# Patient Record
Sex: FEMALE | Race: WHITE | NOT HISPANIC OR LATINO | URBAN - METROPOLITAN AREA
[De-identification: names, ages, dates, MRNs, and addresses within clinical notes are randomized per-mention and may not be internally consistent; named-entity substitution may affect disease eponyms.]

---

## 2024-09-17 ENCOUNTER — OFFICE VISIT (OUTPATIENT)
Dept: ORTHOPEDIC SURGERY | Facility: CLINIC | Age: 54
End: 2024-09-17
Payer: COMMERCIAL

## 2024-09-17 VITALS — HEIGHT: 69 IN | WEIGHT: 260 LBS | BODY MASS INDEX: 38.51 KG/M2

## 2024-09-17 DIAGNOSIS — M17.0 ARTHRITIS OF BOTH KNEES: Primary | ICD-10-CM

## 2024-09-17 DIAGNOSIS — G89.29 CHRONIC PAIN OF BOTH KNEES: ICD-10-CM

## 2024-09-17 DIAGNOSIS — M25.562 CHRONIC PAIN OF BOTH KNEES: ICD-10-CM

## 2024-09-17 DIAGNOSIS — M25.561 CHRONIC PAIN OF BOTH KNEES: ICD-10-CM

## 2024-09-17 PROCEDURE — 1036F TOBACCO NON-USER: CPT

## 2024-09-17 PROCEDURE — 20610 DRAIN/INJ JOINT/BURSA W/O US: CPT | Mod: 50

## 2024-09-17 PROCEDURE — 99213 OFFICE O/P EST LOW 20 MIN: CPT

## 2024-09-17 PROCEDURE — 3008F BODY MASS INDEX DOCD: CPT

## 2024-09-17 PROCEDURE — 2500000004 HC RX 250 GENERAL PHARMACY W/ HCPCS (ALT 636 FOR OP/ED)

## 2024-09-17 PROCEDURE — 2500000005 HC RX 250 GENERAL PHARMACY W/O HCPCS

## 2024-09-17 RX ORDER — TRIAMCINOLONE ACETONIDE 40 MG/ML
40 INJECTION, SUSPENSION INTRA-ARTICULAR; INTRAMUSCULAR
Status: COMPLETED | OUTPATIENT
Start: 2024-09-17 | End: 2024-09-17

## 2024-09-17 RX ORDER — LIDOCAINE HYDROCHLORIDE 20 MG/ML
2 INJECTION, SOLUTION INFILTRATION; PERINEURAL
Status: COMPLETED | OUTPATIENT
Start: 2024-09-17 | End: 2024-09-17

## 2024-09-17 NOTE — PROGRESS NOTES
Subjective    Patient ID: Luzma HOLBROOK is a 54 y.o. female.    Chief Complaint: Follow-up bilateral knees    HPI  This is a pleasant 54-year-old female presenting to the office for follow-up of bilateral knee pain.  I last saw patient in October 2023, when she received bilateral intra-articular knee injections of Kenalog/lidocaine, which were of benefit up until the last few weeks.  There is been no new injury since her last visit.  She continues to point to the medial aspect of both knees when describing pain.  Pain is described as a constant dull ache, sharp shooting at times with certain movements.  She does notice intermittent swelling bilaterally.  Pain hinders her activities of daily living including prolonged standing walking and stair climbing.  She has had gel injections in the past, which she states were not of benefit.  She does continue to use Tylenol as well as apply heat or ice with temporary relief of symptoms.  She cannot take anti-inflammatories as she has had bariatric surgery in the past.  She has lost approximately 200 pounds since her bariatric surgery.      The patient's past medical, surgical, family, and social history as well as allergies and medications were reviewed and updated in the chart.    Objective   Ortho Exam  Pleasant and no acute distress. Walks with an antalgic gait. Bilateral knees appearing without soft tissue swelling erythema.  There is slight ecchymosis noted to lateral knee.  There is no warmth upon touch.  Patellofemoral crepitus noted with range of motion testing bilaterally.  Right knee range of motion is 3-115°. There is a mild effusion. The knee is stable to varus and valgus stress Lachman and posterior drawer. There is generalized tenderness. Left knee range of motion is 3-110°. There is a mild effusion. The knee is stable to varus and valgus stress Lachman and posterior drawer. There is generalized tenderness, but most notable to the medial joint line.. Both  lower extremities are well perfused the skin is intact and muscle tone is adequate.     Image Results:  XR knee 3 views bilateral  Narrative: Interpreted By:  Kevon Ledezma,   STUDY:  XR KNEE 3 VIEWS BILATERAL; ;  10/13/2023 8:20 am      INDICATION:  Signs/Symptoms:knee pain.      COMPARISON:  None.      ACCESSION NUMBER(S):  [id number]      ORDERING CLINICIAN:  JERRI ESTEBAN      FINDINGS:  Bilateral knees, three views of each      There is moderate tricompartment joint space narrowing with  osteophytosis bilaterally worse in the medial compartments. Small  effusions present. Intra-articular ossific bodies present as well      Impression: Moderate bilateral knee osteoarthritis. Findings are more pronounced  in the medial compartments.          MACRO:  None      Signed by: Kevon Ledezma 10/14/2023 9:07 AM  Dictation workstation:   [id number]      Assessment/Plan   Encounter Diagnoses:  Arthritis of both knees    Chronic pain of both knees    Plan: Discussion with patient in regards to continued diagnosis of bilateral knee arthritis with review of conservative and surgical treatment options.  Patient would like to continue with conservative treatment options that they have been of benefit.  As patient desired.  A left and right knee intra-articular steroid injection of Kenalog/lidocaine was provided and tolerated well.  She can receive these injections every 3 to 4 months as needed.  She will continue with attempts at weight loss and continue with her home exercise program.  She can continue with Tylenol as well as heat and ice application to help decrease breakthrough pain inflammation.  She will avoid aggravating activities and follow-up as symptoms dictate.    L Inj/Asp: bilateral knee on 9/17/2024 12:16 PM  Indications: pain  Details: 22 G needle, superolateral approach  Medications (Right): 40 mg triamcinolone acetonide 40 mg/mL; 2 mL lidocaine 20 mg/mL (2 %)  Medications (Left): 40 mg triamcinolone  acetonide 40 mg/mL; 2 mL lidocaine 20 mg/mL (2 %)  Procedure, treatment alternatives, risks and benefits explained, specific risks discussed. Consent was given by the patient.

## 2024-09-19 ENCOUNTER — APPOINTMENT (OUTPATIENT)
Dept: ORTHOPEDIC SURGERY | Facility: CLINIC | Age: 54
End: 2024-09-19
Payer: COMMERCIAL

## 2025-05-27 ENCOUNTER — OFFICE VISIT (OUTPATIENT)
Dept: ORTHOPEDIC SURGERY | Facility: CLINIC | Age: 55
End: 2025-05-27
Payer: COMMERCIAL

## 2025-05-27 VITALS — HEIGHT: 69 IN | BODY MASS INDEX: 35.55 KG/M2 | WEIGHT: 240 LBS

## 2025-05-27 DIAGNOSIS — M17.0 ARTHRITIS OF BOTH KNEES: ICD-10-CM

## 2025-05-27 DIAGNOSIS — M25.561 CHRONIC PAIN OF BOTH KNEES: Primary | ICD-10-CM

## 2025-05-27 DIAGNOSIS — G89.29 CHRONIC PAIN OF BOTH KNEES: Primary | ICD-10-CM

## 2025-05-27 DIAGNOSIS — M25.562 CHRONIC PAIN OF BOTH KNEES: Primary | ICD-10-CM

## 2025-05-27 PROCEDURE — 99213 OFFICE O/P EST LOW 20 MIN: CPT

## 2025-05-27 PROCEDURE — 3008F BODY MASS INDEX DOCD: CPT

## 2025-05-27 PROCEDURE — 1036F TOBACCO NON-USER: CPT

## 2025-05-27 PROCEDURE — 99213 OFFICE O/P EST LOW 20 MIN: CPT | Mod: 25

## 2025-05-27 PROCEDURE — 20610 DRAIN/INJ JOINT/BURSA W/O US: CPT | Mod: 50

## 2025-05-27 PROCEDURE — 2500000004 HC RX 250 GENERAL PHARMACY W/ HCPCS (ALT 636 FOR OP/ED)

## 2025-05-27 RX ADMIN — TRIAMCINOLONE ACETONIDE 40 MG: 400 INJECTION, SUSPENSION INTRA-ARTICULAR; INTRAMUSCULAR at 16:00

## 2025-05-27 RX ADMIN — LIDOCAINE HYDROCHLORIDE 2 ML: 20 INJECTION, SOLUTION INFILTRATION; PERINEURAL at 16:00

## 2025-05-28 RX ORDER — TRIAMCINOLONE ACETONIDE 40 MG/ML
40 INJECTION, SUSPENSION INTRA-ARTICULAR; INTRAMUSCULAR
Status: COMPLETED | OUTPATIENT
Start: 2025-05-27 | End: 2025-05-27

## 2025-05-28 RX ORDER — LIDOCAINE HYDROCHLORIDE 20 MG/ML
2 INJECTION, SOLUTION INFILTRATION; PERINEURAL
Status: COMPLETED | OUTPATIENT
Start: 2025-05-27 | End: 2025-05-27

## 2025-05-28 ASSESSMENT — ENCOUNTER SYMPTOMS
KNEE SWELLING: 1
KNEE DEFORMITY: 1

## 2025-05-28 NOTE — PROGRESS NOTES
Subjective    Patient ID: Luzma Kinney is a 55 y.o. female.    Chief Complaint: Follow-up of the Right Knee (pain) and Follow-up of the Left Knee     Right Knee     Left Knee       This is a pleasant 55-year-old female presenting to the office for follow-up in regards to bilateral knee pain.  She has a history of bilateral knee arthritis.  I last provided patient bilateral knee injections September 17, 2024.  She states that injections were in benefit for approximately 3 to 4 months.  She has not been able to make it back to the office as she has been taking care of her mother with dementia.  There is been no recent injury or fall.  She continues to point to the medial aspect of both knees when describing the pain.  Pain is described as a constant dull ache, sharp shooting at times.  She will notice intermittent swelling bilaterally.  Pain hinders her activities of daily living including prolonged standing walking and stair climbing.  She has had gel injections in the past, which she states were not of benefit.  She will occasionally use Tylenol as well as apply heat or ice with temporary relief of symptoms.  She cannot take anti-inflammatories as she has had bariatric surgery in the past.  She is lost approximately 200 pounds since her bariatric surgery.    The patient's past medical, surgical, family, and social history as well as allergies and medications were reviewed and updated in the chart.    Objective   Ortho Exam  Pleasant and no acute distress. Walks with an antalgic gait. Bilateral knees appearing without soft tissue swelling erythema.  There is slight ecchymosis noted to lateral knee.  There is no warmth upon touch.  Patellofemoral crepitus noted with range of motion testing bilaterally.  Right knee range of motion is 3-115°. There is a mild effusion. The knee is stable to varus and valgus stress Lachman and posterior drawer. There is generalized tenderness. Left knee range of motion is 3-110°.  There is a mild effusion. The knee is stable to varus and valgus stress Lachman and posterior drawer. There is generalized tenderness, but most notable to the medial joint line.. Both lower extremities are well perfused the skin is intact and muscle tone is adequate.     Image Results:  XR knee 3 views bilateral  Narrative: Interpreted By:  Kevon Ledezma,   STUDY:  XR KNEE 3 VIEWS BILATERAL; ;  10/13/2023 8:20 am      INDICATION:  Signs/Symptoms:knee pain.      COMPARISON:  None.      ACCESSION NUMBER(S):  [id number]      ORDERING CLINICIAN:  JERRI ESTEBAN      FINDINGS:  Bilateral knees, three views of each      There is moderate tricompartment joint space narrowing with  osteophytosis bilaterally worse in the medial compartments. Small  effusions present. Intra-articular ossific bodies present as well      Impression: Moderate bilateral knee osteoarthritis. Findings are more pronounced  in the medial compartments.          MACRO:  None      Signed by: Kevon Ledezma 10/14/2023 9:07 AM  Dictation workstation:   [id number]      Assessment/Plan   Encounter Diagnoses:  Chronic pain of both knees    Arthritis of both knees    Plan: Discussion with patient in regards to continued diagnosis of bilateral knee arthritis with review of conservative and surgical treatment options.  Patient would like to continue with conservative treatment options at this time as they were of benefit.  As patient desired, a left and right knee intra-articular steroid injection of Kenalog/lidocaine were provided and tolerated well.  She can receive these injections every 3 to 4 months as needed.  She will continue with attempts at weight loss and continue with her home exercise program.  She can continue to take Tylenol as well as apply heat or ice.  She will avoid aggravating activities and follow-up as needed.    L Inj/Asp: bilateral knee on 5/27/2025 4:00 PM  Indications: pain  Details: 22 G needle, superolateral  approach  Medications (Right): 40 mg triamcinolone acetonide 40 mg/mL; 2 mL lidocaine 20 mg/mL (2 %)  Medications (Left): 40 mg triamcinolone acetonide 40 mg/mL; 2 mL lidocaine 20 mg/mL (2 %)  Procedure, treatment alternatives, risks and benefits explained, specific risks discussed. Consent was given by the patient.

## 2025-06-10 ENCOUNTER — PATIENT OUTREACH (OUTPATIENT)
Dept: CARE COORDINATION | Facility: CLINIC | Age: 55
End: 2025-06-10
Payer: COMMERCIAL

## 2025-06-10 NOTE — PROGRESS NOTES
Outreach to the patient following their recent visit to the ED to assess their needs and provide any necessary follow-up support. The attempt to reach the patient was unsuccessful, unable to make contact at this time. LVM with my contact information.    Mary Gutierrez RN, [facility]  Phone [phone]

## 2025-08-25 ENCOUNTER — OFFICE VISIT (OUTPATIENT)
Dept: ORTHOPEDIC SURGERY | Facility: CLINIC | Age: 55
End: 2025-08-25
Payer: COMMERCIAL

## 2025-08-25 DIAGNOSIS — M25.562 CHRONIC PAIN OF BOTH KNEES: ICD-10-CM

## 2025-08-25 DIAGNOSIS — G89.29 CHRONIC PAIN OF BOTH KNEES: ICD-10-CM

## 2025-08-25 DIAGNOSIS — M17.0 ARTHRITIS OF BOTH KNEES: Primary | ICD-10-CM

## 2025-08-25 DIAGNOSIS — M25.561 CHRONIC PAIN OF BOTH KNEES: ICD-10-CM

## 2025-08-25 PROCEDURE — 99212 OFFICE O/P EST SF 10 MIN: CPT | Mod: 25

## 2025-08-25 PROCEDURE — 2500000004 HC RX 250 GENERAL PHARMACY W/ HCPCS (ALT 636 FOR OP/ED)

## 2025-08-25 PROCEDURE — 99213 OFFICE O/P EST LOW 20 MIN: CPT

## 2025-08-25 PROCEDURE — 20610 DRAIN/INJ JOINT/BURSA W/O US: CPT | Mod: 50

## 2025-08-25 RX ORDER — LIDOCAINE HYDROCHLORIDE 20 MG/ML
2 INJECTION, SOLUTION INFILTRATION; PERINEURAL
Status: COMPLETED | OUTPATIENT
Start: 2025-08-25 | End: 2025-08-25

## 2025-08-25 RX ORDER — TRIAMCINOLONE ACETONIDE 40 MG/ML
40 INJECTION, SUSPENSION INTRA-ARTICULAR; INTRAMUSCULAR
Status: COMPLETED | OUTPATIENT
Start: 2025-08-25 | End: 2025-08-25

## 2025-08-25 RX ADMIN — LIDOCAINE HYDROCHLORIDE 2 ML: 20 INJECTION, SOLUTION INFILTRATION; PERINEURAL at 13:30

## 2025-08-25 RX ADMIN — TRIAMCINOLONE ACETONIDE 40 MG: 40 INJECTION, SUSPENSION INTRA-ARTICULAR; INTRAMUSCULAR at 13:30

## 2025-08-25 ASSESSMENT — ENCOUNTER SYMPTOMS
KNEE DEFORMITY: 1
KNEE SWELLING: 1